# Patient Record
(demographics unavailable — no encounter records)

---

## 2024-12-06 NOTE — REASON FOR VISIT
[Home] : at home, [unfilled] , at the time of the visit. [Medical Office: ([facility])___] : at the medical office located in  [Other:____] : [unfilled] [Patient] : the patient [Acute] : an acute visit [Spouse] : spouse [Family Member] : family member

## 2024-12-06 NOTE — REASON FOR VISIT
[Home] : at home, [unfilled] , at the time of the visit. [Medical Office: ([facility])___] : at the medical office located in  [Other:____] : [unfilled] [Patient] : the patient [Acute] : an acute visit [Spouse] : spouse [Family Member] : family member

## 2024-12-08 NOTE — HISTORY OF PRESENT ILLNESS
[FreeTextEntry1] : WELLINGTON JENNINGS is a 79 yo woman with PMH of memory loss, anxiety, HTN, HLD, and DM2 (previously on metformin) who presents for acute visit. Patient is accompanied by her son (Luis Armando) and  who provided collateral history.  Patient has had more difficulty ambulating lately. States that she has a fear of falling. Son states that on several occasions patient has stopped walking and gently slid herself to the floor. Never any injuries or head trauma. She is not using a walker or cane. Not currently doing PT. Otherwise doing well. Sleeping well. Maintains good appetite. No urinary symptoms.

## 2024-12-08 NOTE — PHYSICAL EXAM
[Alert] : alert [No Acute Distress] : in no acute distress [No Respiratory Distress] : no respiratory distress [Respiration, Rhythm And Depth] : normal respiratory rhythm and effort [Normal Gait] : abnormal gait [Normal Affect] : the affect was normal

## 2024-12-08 NOTE — REVIEW OF SYSTEMS
[Anxiety] : anxiety [Feeling Poorly] : not feeling poorly [Feeling Tired] : not feeling tired [Discharge From Eyes] : no purulent discharge from the eyes [Dry Eyes] : no dryness of the eyes [Nasal Discharge] : no nasal discharge [Sore Throat] : no sore throat [Chest Pain] : no chest pain [Palpitations] : no palpitations [Shortness Of Breath] : no shortness of breath [Wheezing] : no wheezing [Cough] : no cough [SOB on Exertion] : no shortness of breath during exertion [Abdominal Pain] : no abdominal pain [Vomiting] : no vomiting [Constipation] : no constipation [Diarrhea] : no diarrhea [Dysuria] : no dysuria [Incontinence] : no incontinence [Limb Pain] : no limb pain [Skin Wound] : no skin wound [Dizziness] : no dizziness [Depression] : no depression [Easy Bleeding] : no tendency for easy bleeding [Easy Bruising] : no tendency for easy bruising

## 2024-12-08 NOTE — HISTORY OF PRESENT ILLNESS
[FreeTextEntry1] : WELLINGTON JENNINGS is a 81 yo woman with PMH of memory loss, anxiety, HTN, HLD, and DM2 (previously on metformin) who presents for acute visit. Patient is accompanied by her son (Luis Armando) and  who provided collateral history.  Patient has had more difficulty ambulating lately. States that she has a fear of falling. Son states that on several occasions patient has stopped walking and gently slid herself to the floor. Never any injuries or head trauma. She is not using a walker or cane. Not currently doing PT. Otherwise doing well. Sleeping well. Maintains good appetite. No urinary symptoms.

## 2025-03-20 NOTE — HISTORY OF PRESENT ILLNESS
[FreeTextEntry1] : WELLINGTON BOWERS is a 82 yo woman with PMH of memory loss, anxiety, HTN, HLD, and DM2 (previously on metformin) who presents for acute visit. Patient is accompanied by her son (Luis Armando) and her neice who provided collateral history.  Son states that patient has had worsening mobility since December. She is primarily wheelchair bound at this point. He is requesting a hospital bed. States that patient is also no longer following commands. States that she is eating and drinking well. Also sleeping well. No infectious symptoms.    Patient's primary HCP is Yusuf Bowers () [phone] and secondary HCP is Luis Armando Bowers (son) [phone]. She does not have a completed MOLST in place. I have opened discussion at prior visits but family wanted to continue thinking about the form prior to completing it.  [With Patient/Caregiver] : , with patient/caregiver [Reviewed no changes] : Reviewed, no changes [I will adhere to the patient's wishes.] : I will adhere to the patient's wishes. [AdvancecareDate] : 03/25 [FreeTextEntry4] : -HCP: Herstephanie Bowers () [phone] -secondary HCP: Luis Armando Bowers (son) [phone] -discussed progression of dementia today (now wheelchair bound) and importance of discussing GOC. Reintroduced MOLST form. Son would like to schedule telephonic visit with his dad to complete discuss further and complete the form.

## 2025-03-20 NOTE — REASON FOR VISIT
[Acute] : an acute visit [Home] : at home, [unfilled] , at the time of the visit. [Medical Office: ([facility])___] : at the medical office located in  [Spouse] : spouse [Family Member] : family member [Other:____] : [unfilled] [Patient] : the patient [Formal Caregiver] : formal caregiver

## 2025-03-20 NOTE — PHYSICAL EXAM
[Alert] : alert [No Respiratory Distress] : no respiratory distress [Respiration, Rhythm And Depth] : normal respiratory rhythm and effort [Normal Affect] : the affect was normal [Normal Gait] : abnormal gait